# Patient Record
Sex: MALE
[De-identification: names, ages, dates, MRNs, and addresses within clinical notes are randomized per-mention and may not be internally consistent; named-entity substitution may affect disease eponyms.]

---

## 2018-10-03 ENCOUNTER — RECORD ABSTRACTING (OUTPATIENT)
Age: 68
End: 2018-10-03

## 2018-10-03 DIAGNOSIS — Z86.39 PERSONAL HISTORY OF OTHER ENDOCRINE, NUTRITIONAL AND METABOLIC DISEASE: ICD-10-CM

## 2018-10-03 DIAGNOSIS — Z78.9 OTHER SPECIFIED HEALTH STATUS: ICD-10-CM

## 2019-02-19 ENCOUNTER — APPOINTMENT (OUTPATIENT)
Dept: PULMONOLOGY | Facility: CLINIC | Age: 69
End: 2019-02-19
Payer: MEDICARE

## 2019-02-19 VITALS
OXYGEN SATURATION: 96 % | BODY MASS INDEX: 27.63 KG/M2 | DIASTOLIC BLOOD PRESSURE: 85 MMHG | HEART RATE: 75 BPM | SYSTOLIC BLOOD PRESSURE: 124 MMHG | RESPIRATION RATE: 16 BRPM | HEIGHT: 70 IN | WEIGHT: 193 LBS

## 2019-02-19 LAB — POCT - HEMOGLOBIN (HGB), QUANTITATIVE, TRANSCUTANEOUS: 11.8

## 2019-02-19 PROCEDURE — 94750: CPT

## 2019-02-19 PROCEDURE — 94726 PLETHYSMOGRAPHY LUNG VOLUMES: CPT

## 2019-02-19 PROCEDURE — 94060 EVALUATION OF WHEEZING: CPT

## 2019-02-19 PROCEDURE — 94664 DEMO&/EVAL PT USE INHALER: CPT | Mod: 59

## 2019-02-19 PROCEDURE — 88738 HGB QUANT TRANSCUTANEOUS: CPT

## 2019-02-19 PROCEDURE — 99214 OFFICE O/P EST MOD 30 MIN: CPT | Mod: 25

## 2019-02-19 PROCEDURE — 94729 DIFFUSING CAPACITY: CPT

## 2019-02-19 NOTE — CONSULT LETTER
[Dear  ___] : Dear ~CLEM, [Consult Letter:] : I had the pleasure of evaluating your patient, [unfilled]. [Consult Closing:] : Thank you very much for allowing me to participate in the care of this patient.  If you have any questions, please do not hesitate to contact me. [Sincerely,] : Sincerely, [FreeTextEntry2] : Scott Go MD\par  [FreeTextEntry3] : Chacorta Cordova MD FCCP\par

## 2019-02-19 NOTE — PROCEDURE
[FreeTextEntry1] : Pulmonary function testing.\par These data demonstrate a mild obstructive ventilatory deficit. There is no significant bronchodilator response. There is elevation in the RV/TLC ratio indicative of possible air trapping. Diffusion capacity is normal. Airway resistance is mildly increased. \par Mild decrease function from prior.

## 2019-02-19 NOTE — PHYSICAL EXAM
[General Appearance - Well Developed] : well developed [Normal Oropharynx] : normal oropharynx [Jugular Venous Distention Increased] : there was no jugular-venous distention [Heart Sounds] : normal S1 and S2 [Murmurs] : no murmurs present [Lungs Percussion] : the lungs were normal to percussion [Abdomen Soft] : soft [Abdomen Tenderness] : non-tender [Abdomen Mass (___ Cm)] : no abdominal mass palpated [Nail Clubbing] : no clubbing of the fingernails [Cyanosis, Localized] : no localized cyanosis [Petechial Hemorrhages (___cm)] : no petechial hemorrhages [] : no ischemic changes [FreeTextEntry1] : occ rhonchi and wheeze predom left

## 2019-02-19 NOTE — HISTORY OF PRESENT ILLNESS
[FreeTextEntry1] : Stable cough. Occ mucous especially if URI.\par White or pale green. \par \par Chronic issues with sinus.

## 2019-02-19 NOTE — ASSESSMENT
[FreeTextEntry1] : No response to bronchodilator therapy in past. Will observe.\par For f/u CT Chest

## 2019-05-07 ENCOUNTER — RX CHANGE (OUTPATIENT)
Age: 69
End: 2019-05-07

## 2019-06-18 ENCOUNTER — APPOINTMENT (OUTPATIENT)
Dept: PULMONOLOGY | Facility: CLINIC | Age: 69
End: 2019-06-18
Payer: MEDICARE

## 2019-06-18 VITALS
SYSTOLIC BLOOD PRESSURE: 135 MMHG | RESPIRATION RATE: 12 BRPM | OXYGEN SATURATION: 94 % | TEMPERATURE: 98.4 F | DIASTOLIC BLOOD PRESSURE: 78 MMHG | HEART RATE: 83 BPM

## 2019-06-18 PROCEDURE — 99214 OFFICE O/P EST MOD 30 MIN: CPT

## 2019-06-19 NOTE — HISTORY OF PRESENT ILLNESS
[FreeTextEntry1] : No definitive change in respiratory status but some increase in cough nocturnally.\par \par Feels no def. change in XAVIER.\par \par Overall feels relatively well. 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 \par \par Patient sure had bronchoscopy in past with Dr. Hamm

## 2019-06-19 NOTE — ASSESSMENT
[FreeTextEntry1] : Patient is on prolonged course of antibiotics which was started after the last CAT scan.\par For this reason we will repeat the CAT scan a few weeks after completing the course of antibiotic therapy. If there is no significant improvement would recommend repeat fiberoptic bronchoscopy.\par \par In the interim have obtained sputum cultures\par \par Will followup after CT

## 2019-06-19 NOTE — DISCUSSION/SUMMARY
[FreeTextEntry1] : Bronchiectasis\par Progressive LLL infiltrate likely related to above. \par Increasing nodule also likely related to bronchiectasis and infection

## 2019-06-19 NOTE — PHYSICAL EXAM
[Normal Oropharynx] : normal oropharynx [General Appearance - Well Developed] : well developed [Heart Sounds] : normal S1 and S2 [Jugular Venous Distention Increased] : there was no jugular-venous distention [Murmurs] : no murmurs present [Lungs Percussion] : the lungs were normal to percussion [Abdomen Tenderness] : non-tender [Abdomen Soft] : soft [Cyanosis, Localized] : no localized cyanosis [Abdomen Mass (___ Cm)] : no abdominal mass palpated [Nail Clubbing] : no clubbing of the fingernails [] : no ischemic changes [Petechial Hemorrhages (___cm)] : no petechial hemorrhages [FreeTextEntry1] : 1 plus crackles and wheeze predom left base

## 2019-06-19 NOTE — CONSULT LETTER
[Dear  ___] : Dear ~CLEM, [Consult Closing:] : Thank you very much for allowing me to participate in the care of this patient.  If you have any questions, please do not hesitate to contact me. [Consult Letter:] : I had the pleasure of evaluating your patient, [unfilled]. [Please see my note below.] : Please see my note below. [Sincerely,] : Sincerely, [FreeTextEntry2] : Scott Go MD\par  [FreeTextEntry3] : Chacorta Cordova MD FCCP\par

## 2019-06-24 ENCOUNTER — MEDICATION RENEWAL (OUTPATIENT)
Age: 69
End: 2019-06-24

## 2019-06-24 LAB — BACTERIA SPT CULT: ABNORMAL

## 2019-07-31 ENCOUNTER — OTHER (OUTPATIENT)
Age: 69
End: 2019-07-31

## 2023-02-02 ENCOUNTER — APPOINTMENT (OUTPATIENT)
Dept: PULMONOLOGY | Facility: CLINIC | Age: 73
End: 2023-02-02
Payer: MEDICARE

## 2023-02-02 VITALS
DIASTOLIC BLOOD PRESSURE: 79 MMHG | HEIGHT: 70 IN | BODY MASS INDEX: 27.2 KG/M2 | HEART RATE: 63 BPM | SYSTOLIC BLOOD PRESSURE: 155 MMHG | WEIGHT: 190 LBS | OXYGEN SATURATION: 96 %

## 2023-02-02 DIAGNOSIS — J47.1 BRONCHIECTASIS WITH (ACUTE) EXACERBATION: ICD-10-CM

## 2023-02-02 DIAGNOSIS — R91.1 SOLITARY PULMONARY NODULE: ICD-10-CM

## 2023-02-02 LAB — POCT - HEMOGLOBIN (HGB), QUANTITATIVE, TRANSCUTANEOUS: 13.2

## 2023-02-02 PROCEDURE — 99204 OFFICE O/P NEW MOD 45 MIN: CPT | Mod: 25

## 2023-02-02 PROCEDURE — ZZZZZ: CPT

## 2023-02-02 PROCEDURE — 94729 DIFFUSING CAPACITY: CPT

## 2023-02-02 PROCEDURE — 88738 HGB QUANT TRANSCUTANEOUS: CPT

## 2023-02-02 PROCEDURE — 94010 BREATHING CAPACITY TEST: CPT

## 2023-02-02 PROCEDURE — 94727 GAS DIL/WSHOT DETER LNG VOL: CPT

## 2023-02-02 RX ORDER — SULFAMETHOXAZOLE AND TRIMETHOPRIM 800; 160 MG/1; MG/1
800-160 TABLET ORAL
Qty: 60 | Refills: 0 | Status: DISCONTINUED | COMMUNITY
Start: 2019-05-23 | End: 2023-02-02

## 2023-02-02 RX ORDER — LOSARTAN POTASSIUM 100 MG/1
TABLET, FILM COATED ORAL
Refills: 0 | Status: ACTIVE | COMMUNITY

## 2023-02-02 RX ORDER — ATORVASTATIN CALCIUM 80 MG/1
TABLET, FILM COATED ORAL
Refills: 0 | Status: ACTIVE | COMMUNITY

## 2023-02-02 RX ORDER — PITAVASTATIN CALCIUM 4.18 MG/1
TABLET, FILM COATED ORAL
Refills: 0 | Status: DISCONTINUED | COMMUNITY
End: 2023-02-02

## 2023-02-02 RX ORDER — LEVOFLOXACIN 500 MG/1
500 TABLET, FILM COATED ORAL DAILY
Qty: 10 | Refills: 0 | Status: DISCONTINUED | COMMUNITY
Start: 2019-06-24 | End: 2023-02-02

## 2023-02-02 NOTE — PROCEDURE
[FreeTextEntry1] : 02/02/2023\par Pulmonary function testing\par These data demonstrate a mild-moderate obstructive ventilatory deficit. There is elevation in the RV/TLC ratio indicative of possible air trapping. Diffusion capacity is normal. \par Mild decrease in function compared to February 2019.

## 2023-02-02 NOTE — HISTORY OF PRESENT ILLNESS
[Never] : never [TextBox_4] : GOLDEN HILL is a 72 year old  M referred for pulmonary evaluation for bronchiectasis.\par \par Had URI 12/15/22 with significant chest congestion and cough. Sent for CXR told negative. NRAD Referred back.\par Now improved but trace of symptoms. \par presently mild cough. Has mild chronic cough. \par Had rattling with illness now improved. Occ occurs supine. \par Mucous is clear or light green.\par [facility] \par \par Otherwise relatively stable from last visit. \par \par Past pulmonary history. Bronchiectasis.\par Occupational Exposure. \par Family history of pulmonary disease.\par Recent travel\par Pets\par \par Some GERD takes PRN Pepcid fairly often.

## 2023-02-02 NOTE — DISCUSSION/SUMMARY
[FreeTextEntry1] : Bronchiectasis with exacerbation related to respiratory infection.\par Noisy chest.\par \par Chronic bronchiectasis.  Will review prior chart and work-up in the past.  Consider ciliary dyskinesia in light of bronchiectasis and chronic sinus issues.  Rule out chronic infectious etiology such as atypical mycobacterial disease.\par \par Pulmonary nodules.  Some may be related to mucoid impaction.  Want reevaluation when clinically improved.

## 2023-02-02 NOTE — PHYSICAL EXAM
[No Acute Distress] : no acute distress [Normal Oropharynx] : normal oropharynx [Normal Appearance] : normal appearance [No Neck Mass] : no neck mass [Normal Rate/Rhythm] : normal rate/rhythm [Normal S1, S2] : normal s1, s2 [No Murmurs] : no murmurs [No Resp Distress] : no resp distress [Normal to Percussion] : normal to percussion [No Abnormalities] : no abnormalities [Benign] : benign [Normal Gait] : normal gait [No Clubbing] : no clubbing [No Cyanosis] : no cyanosis [No Edema] : no edema [FROM] : FROM [Normal Color/ Pigmentation] : normal color/ pigmentation [No Focal Deficits] : no focal deficits [Oriented x3] : oriented x3 [Normal Affect] : normal affect [TextBox_68] : 1 plus bilat rhonchi and wheeze.

## 2023-02-02 NOTE — CONSULT LETTER
[Dear  ___] : Dear ~CLEM, [Consult Letter:] : I had the pleasure of evaluating your patient, [unfilled]. [Please see my note below.] : Please see my note below. [Consult Closing:] : Thank you very much for allowing me to participate in the care of this patient.  If you have any questions, please do not hesitate to contact me. [Sincerely,] : Sincerely, [FreeTextEntry3] : Chacorta Cordova MD FCCP\par  [FreeTextEntry2] : Scott Go MD\par

## 2023-02-02 NOTE — ASSESSMENT
[FreeTextEntry1] : Sputum culture\par Course prednisone\par HRCT when improved.\par On antibiotics.  Will obtain specific antibiotic and make changes as appropriate.\par Follow-up in 10 to 14 days or sooner on a PRN basis.\par

## 2023-02-06 ENCOUNTER — NON-APPOINTMENT (OUTPATIENT)
Age: 73
End: 2023-02-06

## 2023-02-06 LAB — BACTERIA SPT CULT: ABNORMAL

## 2023-03-21 ENCOUNTER — NON-APPOINTMENT (OUTPATIENT)
Age: 73
End: 2023-03-21

## 2023-03-23 LAB — ACID FAST STN SPT: NORMAL

## 2024-02-06 ENCOUNTER — APPOINTMENT (OUTPATIENT)
Dept: PULMONOLOGY | Facility: CLINIC | Age: 74
End: 2024-02-06
Payer: MEDICARE

## 2024-02-06 VITALS
HEART RATE: 68 BPM | RESPIRATION RATE: 16 BRPM | OXYGEN SATURATION: 95 % | DIASTOLIC BLOOD PRESSURE: 82 MMHG | SYSTOLIC BLOOD PRESSURE: 129 MMHG

## 2024-02-06 DIAGNOSIS — I10 ESSENTIAL (PRIMARY) HYPERTENSION: ICD-10-CM

## 2024-02-06 LAB — POCT - HEMOGLOBIN (HGB), QUANTITATIVE, TRANSCUTANEOUS: 13.1

## 2024-02-06 PROCEDURE — ZZZZZ: CPT

## 2024-02-06 PROCEDURE — 94727 GAS DIL/WSHOT DETER LNG VOL: CPT

## 2024-02-06 PROCEDURE — 94010 BREATHING CAPACITY TEST: CPT

## 2024-02-06 PROCEDURE — 88738 HGB QUANT TRANSCUTANEOUS: CPT

## 2024-02-06 PROCEDURE — 99214 OFFICE O/P EST MOD 30 MIN: CPT | Mod: 25

## 2024-02-06 PROCEDURE — 94729 DIFFUSING CAPACITY: CPT

## 2024-02-06 RX ORDER — PREDNISONE 10 MG/1
10 TABLET ORAL
Qty: 18 | Refills: 0 | Status: DISCONTINUED | COMMUNITY
Start: 2023-02-02 | End: 2024-02-06

## 2024-02-06 RX ORDER — CARVEDILOL 3.12 MG/1
3.12 TABLET, FILM COATED ORAL
Qty: 180 | Refills: 3 | Status: ACTIVE | COMMUNITY
Start: 2024-02-06

## 2024-02-06 RX ORDER — AMOXICILLIN AND CLAVULANATE POTASSIUM 875; 125 MG/1; MG/1
875-125 TABLET, COATED ORAL
Qty: 20 | Refills: 0 | Status: DISCONTINUED | COMMUNITY
Start: 2023-02-06 | End: 2024-02-06

## 2024-02-06 NOTE — CONSULT LETTER
[Dear  ___] : Dear ~CLEM, [Consult Letter:] : I had the pleasure of evaluating your patient, [unfilled]. [Please see my note below.] : Please see my note below. [Consult Closing:] : Thank you very much for allowing me to participate in the care of this patient.  If you have any questions, please do not hesitate to contact me. [Sincerely,] : Sincerely, [FreeTextEntry2] : Scott Go MD\par   [FreeTextEntry3] : Chacorta Cordova MD FCCP\par

## 2024-02-06 NOTE — HISTORY OF PRESENT ILLNESS
[Never] : never [TextBox_4] : here for f/u re bronchiectasis. no recent uri finds has increased difficulty clearing mucus production for the past 6 months. Especially in PM.   mucus pale green to office white has intermittent wheeze. worse when lying down. no inhaler use/ no recent antibiotic use wants to know when should be next ct did try mucus clearance devices in past and did not find helpful has a postnasal drip, sees ENT No HFN.  has been on inhalers in past without definitive response.     Some GERD takes PRN Pepcid fairly often.

## 2024-02-06 NOTE — PROCEDURE
[FreeTextEntry1] : 02/06/2024 Pulmonary function testing These data demonstrate a mild obstructive ventilatory deficit. Normal Lung Volumes. There is a mild diffusion impairment. Corrects to normal with lung volume correction  No significant change in function compared to February 2023.  Flow rates decreased compared to older studies.

## 2024-02-06 NOTE — ASSESSMENT
[FreeTextEntry1] : Recommended treatment with nebulized hypertonic saline.  Pros and cons discussed.  Patient does not wish to start on this modality at this time. Course of prednisone and antibiotics for bronchiectasis exacerbation. Follow-up CT of the chest high-resolution after completion of course of treatment. Discussed alternative pulmonary toilet devices patient states has had in the past without definitive response. Will follow-up post CT or in 6 months or sooner on a as needed basis. Discussed repeating workup for known causes of bronchiectasis patient does not wish to embark on this at this time.  Aware simply laboratory testing. Obtain sputum culture if possible.  Will tailor therapy based upon above.   35 minutes spent in evaluation management and review of studies.

## 2024-02-06 NOTE — DISCUSSION/SUMMARY
[FreeTextEntry1] : Bronchiectasis With chest congestion.  Also present on last visit.  May be chronic. Increased cough and mucus production however indicative of possible exacerbation. Chronic bronchiectasis.  Will review prior chart and work-up in the past.  Consider ciliary dyskinesia in light of bronchiectasis and chronic sinus issues.  Rule out chronic infectious etiology such as atypical mycobacterial disease. Pulmonary nodules.  Some may be related to mucoid impaction.  Want reevaluation when clinically improved.

## 2024-02-12 ENCOUNTER — NON-APPOINTMENT (OUTPATIENT)
Age: 74
End: 2024-02-12

## 2024-02-12 LAB — BACTERIA SPT CULT: ABNORMAL

## 2024-02-27 ENCOUNTER — NON-APPOINTMENT (OUTPATIENT)
Age: 74
End: 2024-02-27

## 2024-03-06 ENCOUNTER — NON-APPOINTMENT (OUTPATIENT)
Age: 74
End: 2024-03-06

## 2024-03-06 LAB — FUNGUS SPT CULT: NORMAL

## 2024-03-22 ENCOUNTER — APPOINTMENT (OUTPATIENT)
Dept: PULMONOLOGY | Facility: CLINIC | Age: 74
End: 2024-03-22
Payer: MEDICARE

## 2024-03-22 VITALS
BODY MASS INDEX: 26.48 KG/M2 | DIASTOLIC BLOOD PRESSURE: 80 MMHG | WEIGHT: 185 LBS | HEART RATE: 80 BPM | OXYGEN SATURATION: 96 % | SYSTOLIC BLOOD PRESSURE: 138 MMHG | HEIGHT: 70 IN

## 2024-03-22 DIAGNOSIS — J47.9 BRONCHIECTASIS, UNCOMPLICATED: ICD-10-CM

## 2024-03-22 DIAGNOSIS — R91.8 OTHER NONSPECIFIC ABNORMAL FINDING OF LUNG FIELD: ICD-10-CM

## 2024-03-22 PROCEDURE — 99214 OFFICE O/P EST MOD 30 MIN: CPT | Mod: 25

## 2024-03-22 PROCEDURE — 94010 BREATHING CAPACITY TEST: CPT

## 2024-03-22 RX ORDER — PREDNISONE 10 MG/1
10 TABLET ORAL
Qty: 30 | Refills: 0 | Status: DISCONTINUED | COMMUNITY
Start: 2024-02-06 | End: 2024-03-22

## 2024-03-22 RX ORDER — AZITHROMYCIN 250 MG/1
250 TABLET, FILM COATED ORAL
Qty: 10 | Refills: 0 | Status: DISCONTINUED | COMMUNITY
Start: 2024-02-06 | End: 2024-03-22

## 2024-03-22 NOTE — REASON FOR VISIT
[Consultation] : a consultation [Abnormal CXR/ Chest CT] : an abnormal CXR/ chest CT [Bronchiectasis] : bronchiectasis

## 2024-03-22 NOTE — HISTORY OF PRESENT ILLNESS
[Never] : never [TextBox_4] : Preop for removal of bladder stone. Denies change in resp. status. No cough with eating or drinking. No PC or SOB. Occ wheeze. Occ cough with phlegm chronic. Predom white.  Not doing pulmonary toilet.  no inhaler use/ never tried nebulized saline did try mucus clearance devices in past and did not find helpful has a postnasal drip, sees ENT has been on inhalers in past without definitive response.

## 2024-03-22 NOTE — PHYSICAL EXAM
[Normal Oropharynx] : normal oropharynx [No Acute Distress] : no acute distress [Normal Appearance] : normal appearance [No Neck Mass] : no neck mass [Normal Rate/Rhythm] : normal rate/rhythm [No Murmurs] : no murmurs [Normal S1, S2] : normal s1, s2 [No Resp Distress] : no resp distress [Normal to Percussion] : normal to percussion [No Abnormalities] : no abnormalities [Benign] : benign [No Clubbing] : no clubbing [Normal Gait] : normal gait [No Edema] : no edema [No Cyanosis] : no cyanosis [FROM] : FROM [Normal Color/ Pigmentation] : normal color/ pigmentation [No Focal Deficits] : no focal deficits [Oriented x3] : oriented x3 [Normal Affect] : normal affect [TextBox_68] : 1 plus intermittent bilat rhonchi and wheeze.

## 2024-03-22 NOTE — PROCEDURE
[FreeTextEntry1] : CAT scan of the abdomen March 7, 2024 reviewed.  Radiographic changes same as on prior CT chest to February 27, 2024.  Unchanged from March 2023.  03/22/2024 Pulmonary function testing There is a moderate ventilatory impairment in a restrictive pattern  No significant change in flow rates compared to February 2024.

## 2024-03-22 NOTE — DISCUSSION/SUMMARY
[FreeTextEntry1] : Preop for bladder stone removal. Chronic bronchiectasis.  Will review prior chart and work-up in the past.  Consider ciliary dyskinesia in light of bronchiectasis and chronic sinus issues.  Rule out chronic infectious etiology such as atypical mycobacterial disease. Pulmonary nodules.  Some may be related to mucoid impaction.  Want reevaluation when clinically improved. Radiographic changes and clinical findings are chronic.  Related to bronchiectasis.

## 2024-03-22 NOTE — ASSESSMENT
[FreeTextEntry1] : Previously discussed alternative pulmonary toilet devices patient states has had in the past without definitive response. Will follow-up post CT or in 6 months or sooner on a as needed basis. Discussed repeating workup for known causes of bronchiectasis patient does not wish to embark on this at this time.  Aware simply laboratory testing. Obtain sputum culture if possible.  Will tailor therapy based upon above.  Pulmonary status maximized. Pulmonary function adequate to tolerate proposed surgical procedure.  No pulmonary contraindications to surgery  35 minutes spent in evaluation management and review of studies.

## 2024-03-22 NOTE — CONSULT LETTER
[Consult Letter:] : I had the pleasure of evaluating your patient, [unfilled]. [Dear  ___] : Dear ~CLEM, [Consult Closing:] : Thank you very much for allowing me to participate in the care of this patient.  If you have any questions, please do not hesitate to contact me. [Please see my note below.] : Please see my note below. [Sincerely,] : Sincerely, [FreeTextEntry2] : Scott Go MD\par   [FreeTextEntry3] : Chacorta Cordova MD FCCP\par   [DrWm  ___] : Dr. GRADY

## 2024-03-25 ENCOUNTER — APPOINTMENT (OUTPATIENT)
Dept: PULMONOLOGY | Facility: CLINIC | Age: 74
End: 2024-03-25

## 2024-03-25 LAB — ACID FAST STN SPT: NORMAL

## 2024-08-06 ENCOUNTER — APPOINTMENT (OUTPATIENT)
Dept: PULMONOLOGY | Facility: CLINIC | Age: 74
End: 2024-08-06

## 2024-08-06 PROCEDURE — 94727 GAS DIL/WSHOT DETER LNG VOL: CPT

## 2024-08-06 PROCEDURE — 94060 EVALUATION OF WHEEZING: CPT

## 2024-08-06 PROCEDURE — 99214 OFFICE O/P EST MOD 30 MIN: CPT | Mod: 25

## 2024-08-06 PROCEDURE — 94729 DIFFUSING CAPACITY: CPT

## 2024-08-06 PROCEDURE — ZZZZZ: CPT

## 2024-08-06 NOTE — CONSULT LETTER
[Dear  ___] : Dear ~CLEM, [Consult Letter:] : I had the pleasure of evaluating your patient, [unfilled]. [Please see my note below.] : Please see my note below. [Consult Closing:] : Thank you very much for allowing me to participate in the care of this patient.  If you have any questions, please do not hesitate to contact me. [Sincerely,] : Sincerely, [DrWm  ___] : Dr. GRADY [FreeTextEntry2] : Scott Go MD\par   [FreeTextEntry3] : Chacorta Cordova MD FCCP\par

## 2024-08-06 NOTE — HISTORY OF PRESENT ILLNESS
[Never] : never [TextBox_4] : had bladder stone removal did well was not able to do a sputum c/s last visit no recent respiratory issues has cough daily with white mucus has chronic sinus issues No cough with eating or drinking. No PC or SOB. Occ wheeze. Not doing pulmonary toilet.  never tried nebulized saline did try mucus clearance devices in past and did not find helpful has chronic sinusitis, sees ENT has been on inhalers in past without definitive response.

## 2024-08-06 NOTE — PROCEDURE
[FreeTextEntry1] : CAT scan of the abdomen March 7, 2024 reviewed.  Radiographic changes same as on prior CT chest to February 27, 2024.  Unchanged from March 2023.  08/06/2024 Pulmonary function testing These data demonstrate a moderate obstructive ventilatory deficit. There is no significant bronchodilator responce. There is elevation in the RV/TLC ratio indicative of possible air trapping. Diffusion capacity is normal Mild decrease in flow rates compared to February 2024.  Mild increase in diffusion capacity.

## 2024-08-06 NOTE — DISCUSSION/SUMMARY
[FreeTextEntry1] : Chronic bronchiectasis.  Consider ciliary dyskinesia in light of bronchiectasis and chronic sinus issues.  Rule out chronic infectious etiology such as atypical mycobacterial disease. Pulmonary nodules.  Some may be related to mucoid impaction.   Radiographic changes and clinical findings are chronic.  Related to bronchiectasis.

## 2024-08-06 NOTE — ASSESSMENT
[FreeTextEntry1] : Previously discussed alternative pulmonary toilet devices patient states has had in the past without definitive response. sputum c/s sent to lab ordered neb and hypertonic saline bid Ct chest 1 year from last, task sent today  r/t 6 months for f/u   35 minutes spent in evaluation management and review of studies.

## 2024-08-06 NOTE — PHYSICAL EXAM
[No Acute Distress] : no acute distress [Normal Oropharynx] : normal oropharynx [Normal Appearance] : normal appearance [No Neck Mass] : no neck mass [Normal Rate/Rhythm] : normal rate/rhythm [Normal S1, S2] : normal s1, s2 [No Murmurs] : no murmurs [No Resp Distress] : no resp distress [Normal to Percussion] : normal to percussion [No Abnormalities] : no abnormalities [Benign] : benign [Normal Gait] : normal gait [No Clubbing] : no clubbing [No Cyanosis] : no cyanosis [No Edema] : no edema [FROM] : FROM [Normal Color/ Pigmentation] : normal color/ pigmentation [No Focal Deficits] : no focal deficits [Oriented x3] : oriented x3 [Normal Affect] : normal affect [No HSM] : no hsm [TextBox_68] : 1 plus intermittent bilat rhonchi and wheeze.

## 2024-09-06 ENCOUNTER — NON-APPOINTMENT (OUTPATIENT)
Age: 74
End: 2024-09-06

## 2025-02-04 ENCOUNTER — APPOINTMENT (OUTPATIENT)
Dept: PULMONOLOGY | Facility: CLINIC | Age: 75
End: 2025-02-04
Payer: MEDICARE

## 2025-02-04 VITALS
OXYGEN SATURATION: 92 % | DIASTOLIC BLOOD PRESSURE: 69 MMHG | SYSTOLIC BLOOD PRESSURE: 124 MMHG | TEMPERATURE: 98 F | HEART RATE: 87 BPM

## 2025-02-04 DIAGNOSIS — R91.1 SOLITARY PULMONARY NODULE: ICD-10-CM

## 2025-02-04 DIAGNOSIS — R91.8 OTHER NONSPECIFIC ABNORMAL FINDING OF LUNG FIELD: ICD-10-CM

## 2025-02-04 DIAGNOSIS — J47.9 BRONCHIECTASIS, UNCOMPLICATED: ICD-10-CM

## 2025-02-04 PROCEDURE — 99214 OFFICE O/P EST MOD 30 MIN: CPT | Mod: 25

## 2025-02-04 PROCEDURE — 95012 NITRIC OXIDE EXP GAS DETER: CPT

## 2025-02-04 PROCEDURE — 94010 BREATHING CAPACITY TEST: CPT

## 2025-02-07 LAB — BACTERIA SPT CULT: ABNORMAL

## 2025-02-07 RX ORDER — AMOXICILLIN AND CLAVULANATE POTASSIUM 875; 125 MG/1; MG/1
875-125 TABLET, COATED ORAL
Qty: 20 | Refills: 0 | Status: ACTIVE | COMMUNITY
Start: 2025-02-07 | End: 1900-01-01

## 2025-02-10 ENCOUNTER — RESULT REVIEW (OUTPATIENT)
Age: 75
End: 2025-02-10

## 2025-02-13 LAB
ACID FAST STN SPT: NORMAL
FUNGUS SPT CULT: NORMAL

## 2025-03-03 ENCOUNTER — NON-APPOINTMENT (OUTPATIENT)
Age: 75
End: 2025-03-03

## 2025-08-05 ENCOUNTER — APPOINTMENT (OUTPATIENT)
Dept: PULMONOLOGY | Facility: CLINIC | Age: 75
End: 2025-08-05
Payer: MEDICARE

## 2025-08-05 VITALS
RESPIRATION RATE: 16 BRPM | HEIGHT: 70 IN | SYSTOLIC BLOOD PRESSURE: 138 MMHG | DIASTOLIC BLOOD PRESSURE: 75 MMHG | WEIGHT: 185 LBS | OXYGEN SATURATION: 95 % | BODY MASS INDEX: 26.48 KG/M2 | HEART RATE: 67 BPM

## 2025-08-05 DIAGNOSIS — R91.8 OTHER NONSPECIFIC ABNORMAL FINDING OF LUNG FIELD: ICD-10-CM

## 2025-08-05 DIAGNOSIS — J47.9 BRONCHIECTASIS, UNCOMPLICATED: ICD-10-CM

## 2025-08-05 DIAGNOSIS — Z23 ENCOUNTER FOR IMMUNIZATION: ICD-10-CM

## 2025-08-05 PROCEDURE — 99214 OFFICE O/P EST MOD 30 MIN: CPT | Mod: 25

## 2025-08-05 PROCEDURE — 94729 DIFFUSING CAPACITY: CPT

## 2025-08-05 PROCEDURE — ZZZZZ: CPT

## 2025-08-05 PROCEDURE — 94010 BREATHING CAPACITY TEST: CPT

## 2025-08-05 PROCEDURE — 90677 PCV20 VACCINE IM: CPT

## 2025-08-05 PROCEDURE — 94727 GAS DIL/WSHOT DETER LNG VOL: CPT

## 2025-08-05 PROCEDURE — G0009: CPT

## 2025-08-05 RX ORDER — AMLODIPINE BESYLATE 5 MG/1
TABLET ORAL
Refills: 0 | Status: ACTIVE | COMMUNITY

## 2025-08-08 ENCOUNTER — NON-APPOINTMENT (OUTPATIENT)
Age: 75
End: 2025-08-08